# Patient Record
Sex: FEMALE | NOT HISPANIC OR LATINO | Employment: UNEMPLOYED | URBAN - METROPOLITAN AREA
[De-identification: names, ages, dates, MRNs, and addresses within clinical notes are randomized per-mention and may not be internally consistent; named-entity substitution may affect disease eponyms.]

---
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
## 2021-04-08 ENCOUNTER — TELEMEDICINE (OUTPATIENT)
Dept: FAMILY MEDICINE CLINIC | Facility: CLINIC | Age: 21
End: 2021-04-08

## 2021-04-08 DIAGNOSIS — T78.40XA ALLERGY, INITIAL ENCOUNTER: Primary | ICD-10-CM

## 2021-04-08 PROCEDURE — 99213 OFFICE O/P EST LOW 20 MIN: CPT | Performed by: FAMILY MEDICINE

## 2021-04-08 RX ORDER — CETIRIZINE HYDROCHLORIDE 10 MG/1
10 TABLET ORAL DAILY
Qty: 30 TABLET | Refills: 11 | Status: SHIPPED | OUTPATIENT
Start: 2021-04-08 | End: 2022-11-01

## 2021-04-08 NOTE — PROGRESS NOTES
Subjective   Marcelina Arauz is a 20 y.o. female.   Consent given    Time 20 min    Visit via [facility]    CC: follow up from ER for blood in postnasal drainage    History of Present Illness     follow up from ER for blood in postnasal drainage    The following portions of the patient's history were reviewed and updated as appropriate: allergies, current medications, past family history, past medical history, past social history, past surgical history and problem list.    Past Medical History:   Diagnosis Date   • Never smoked any substance     Never smoker   • Vitamin D deficiency    • Well child visit        No past surgical history on file.    Family History   Problem Relation Age of Onset   • No Known Problems Other        Social History     Socioeconomic History   • Marital status: Single     Spouse name: Not on file   • Number of children: Not on file   • Years of education: Not on file   • Highest education level: Not on file   Tobacco Use   • Smoking status: Never Smoker   • Smokeless tobacco: Never Used   Substance and Sexual Activity   • Alcohol use: Defer   • Drug use: Defer       Current Outpatient Medications on File Prior to Visit   Medication Sig Dispense Refill   • Acetaminophen (MIDOL PO) Take  by mouth.     • ibuprofen (ADVIL,MOTRIN) 400 MG tablet Take 400 mg by mouth.     • vitamin D (ERGOCALCIFEROL) 06080 units capsule capsule Take 1 capsule by mouth 1 (One) Time Per Week. 5 capsule 11     No current facility-administered medications on file prior to visit.       Review of Systems   HENT: Positive for postnasal drip and rhinorrhea.        Recent Results (from the past 4704 hour(s))   VITAMIN B12    Collection Time: 12/01/20  3:23 PM    Specimen type and source: Serum, Blood   Result Value Ref Range    Vitamin B-12 345 213 - 816 pg/mL   FOLATE    Collection Time: 12/01/20  3:23 PM    Specimen: Fresh Frozen Plasma    Specimen type and source: Plasma, Blood   Result Value Ref Range    Folate 10.7 7.0 -  34.1 ng/mL   TSH    Collection Time: 12/01/20  3:23 PM    Specimen: Fresh Frozen Plasma    Specimen type and source: Plasma, Blood   Result Value Ref Range    TSH 0.935 0.470 - 4.680 u(iU)/mL   VITAMIN D 25 HYDROXY    Collection Time: 12/01/20  3:23 PM    Specimen type and source: Serum, Blood   Result Value Ref Range    25 Hydroxy, Vitamin D 21.1 (L) 30.0 - 100.0 ng/mL   CBC AND DIFFERENTIAL    Collection Time: 12/01/20  3:23 PM    Specimen type and source: Whole Blood, Blood   Result Value Ref Range    WBC 6.68 4.5 - 11.0 10*3/uL    RBC 4.35 4.0 - 5.2 10*6/uL    Hemoglobin 13.1 12.0 - 16.0 g/dL    Hematocrit 39.9 36.0 - 46.0 %    MCV 91.7 80.0 - 100.0 fL    MCH 30.1 26.0 - 34.0 pg    MCHC 32.8 31.0 - 37.0 g/dL    RDW 12.0 12.0 - 16.8 %    Platelets 276 140 - 440 10*3/uL    MPV 12.4 8.4 - 12.4 fL    Differential Type [facility] CBC w/AutoDiff (arb'U)    Neutrophil Rel % 60.4 45 - 80 %    Lymphocyte Rel % 30.1 15 - 50 %    Monocyte Rel % 7.6 0 - 15 %    Eosinophil % 1.2 0 - 7 %    Basophil Rel % 0.6 0 - 2 %    Immature Grans % 0.1 0.0 - 1.0 %    nRBC 0 0 /100(WBC)    Neutrophils Absolute 4.03 2.0 - 8.8 10*3/uL    Lymphocytes Absolute 2.01 0.7 - 5.5 10*3/uL    Monocytes Absolute 0.51 0.0 - 1.7 10*3/uL    Eosinophils Absolute 0.08 0.0 - 0.8 10*3/uL    Basophils Absolute 0.04 0.0 - 0.2 10*3/uL    Immature Grans, Absolute 0.01 0.00 - 0.10 10*3/uL     Objective   There were no vitals filed for this visit.  There is no height or weight on file to calculate BMI.  Physical Exam  Constitutional:       Appearance: Normal appearance.   Neurological:      Mental Status: She is alert.           Diagnoses and all orders for this visit:    1. Allergy, initial encounter (Primary)  -     cetirizine (zyrTEC) 10 MG tablet; Take 1 tablet by mouth Daily.  Dispense: 30 tablet; Refill: 11    ER follow up.

## 2021-09-19 ENCOUNTER — HOSPITAL ENCOUNTER (EMERGENCY)
Facility: HOSPITAL | Age: 21
Discharge: HOME | End: 2021-09-20
Attending: EMERGENCY MEDICINE | Admitting: EMERGENCY MEDICINE

## 2021-09-19 ENCOUNTER — APPOINTMENT (OUTPATIENT)
Dept: CT IMAGING | Facility: HOSPITAL | Age: 21
End: 2021-09-19

## 2021-09-19 VITALS
OXYGEN SATURATION: 100 % | HEART RATE: 109 BPM | SYSTOLIC BLOOD PRESSURE: 130 MMHG | TEMPERATURE: 98.5 F | BODY MASS INDEX: 18.27 KG/M2 | WEIGHT: 107 LBS | DIASTOLIC BLOOD PRESSURE: 70 MMHG | RESPIRATION RATE: 16 BRPM | HEIGHT: 64 IN
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
## 2021-09-19 DIAGNOSIS — S09.90XA MINOR HEAD INJURY, INITIAL ENCOUNTER: Primary | ICD-10-CM

## 2021-09-19 PROCEDURE — 99282 EMERGENCY DEPT VISIT SF MDM: CPT

## 2021-09-19 PROCEDURE — 70450 CT HEAD/BRAIN W/O DYE: CPT
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
## 2021-09-20 NOTE — DISCHARGE INSTRUCTIONS
Home, rest, Tylenol or ibuprofen as needed, follow head injury precautions, follow up with PCP for recheck. Return to care if symptoms worsen or with further concerns.
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
## 2021-10-12 ENCOUNTER — TELEPHONE (OUTPATIENT)
Dept: FAMILY MEDICINE CLINIC | Facility: CLINIC | Age: 21
End: 2021-10-12

## 2021-10-12 DIAGNOSIS — T78.40XA ALLERGY, INITIAL ENCOUNTER: Primary | ICD-10-CM

## 2022-02-28 ENCOUNTER — TELEPHONE (OUTPATIENT)
Dept: FAMILY MEDICINE CLINIC | Facility: CLINIC | Age: 22
End: 2022-02-28

## 2022-03-01 DIAGNOSIS — M54.50 CHRONIC BILATERAL LOW BACK PAIN WITHOUT SCIATICA: Primary | ICD-10-CM

## 2022-03-01 DIAGNOSIS — G89.29 CHRONIC BILATERAL LOW BACK PAIN WITHOUT SCIATICA: Primary | ICD-10-CM

## 2022-03-23 ENCOUNTER — HOSPITAL ENCOUNTER (OUTPATIENT)
Dept: PHYSICAL THERAPY | Facility: HOSPITAL | Age: 22
Setting detail: THERAPIES SERIES
Discharge: HOME | End: 2022-03-23

## 2022-03-23 DIAGNOSIS — M54.6 CHRONIC MIDLINE THORACIC BACK PAIN: Primary | ICD-10-CM

## 2022-03-23 DIAGNOSIS — G89.29 CHRONIC MIDLINE THORACIC BACK PAIN: Primary | ICD-10-CM

## 2022-03-23 PROCEDURE — 97110 THERAPEUTIC EXERCISES: CPT

## 2022-03-23 PROCEDURE — 97161 PT EVAL LOW COMPLEX 20 MIN: CPT
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
## 2022-03-31 ENCOUNTER — TELEPHONE (OUTPATIENT)
Dept: PHYSICAL THERAPY | Facility: HOSPITAL | Age: 22
End: 2022-03-31

## 2022-04-07 ENCOUNTER — TELEPHONE (OUTPATIENT)
Dept: PHYSICAL THERAPY | Facility: HOSPITAL | Age: 22
End: 2022-04-07

## 2022-04-07 DIAGNOSIS — G89.29 CHRONIC MIDLINE THORACIC BACK PAIN: Primary | ICD-10-CM

## 2022-04-07 DIAGNOSIS — M54.6 CHRONIC MIDLINE THORACIC BACK PAIN: Primary | ICD-10-CM

## 2022-04-07 NOTE — TELEPHONE ENCOUNTER
Called pt. Re: 3rd consecutive no show, pt. Reports she had been trying to call to reschedule as appointments given were conflicting with work schedule but she was unable to get through to speak with someone. Informed pt. Remaining visits have been canceled due to clinic no show policy, advised pt. To call and gave front office number to reschedule at more convenient time. Pt. Verbalized understanding.

## 2022-04-18 ENCOUNTER — HOSPITAL ENCOUNTER (OUTPATIENT)
Dept: PHYSICAL THERAPY | Facility: HOSPITAL | Age: 22
Setting detail: THERAPIES SERIES
Discharge: HOME | End: 2022-04-18

## 2022-04-18 DIAGNOSIS — M54.6 CHRONIC MIDLINE THORACIC BACK PAIN: Primary | ICD-10-CM

## 2022-04-18 DIAGNOSIS — G89.29 CHRONIC MIDLINE THORACIC BACK PAIN: Primary | ICD-10-CM

## 2022-04-18 PROCEDURE — 97110 THERAPEUTIC EXERCISES: CPT

## 2022-04-18 NOTE — THERAPY TREATMENT NOTE
Outpatient Physical Therapy Ortho Treatment Note  [facility]     Patient Name: Marcelina Arauz  : 2000  MRN: [id number]  Today's Date: 2022      Visit Date: 2022    Visit Dx:    ICD-10-CM ICD-9-CM   1. Chronic midline thoracic back pain  M54.6 724.1    G89.29 338.29       Patient Active Problem List   Diagnosis   • Plantar wart of left foot   • Allergies        Past Medical History:   Diagnosis Date   • Never smoked any substance     Never smoker   • Vitamin D deficiency    • Well child visit         No past surgical history on file.                     PT Assessment/Plan     Row Name 22 1200          PT Assessment    Assessment Comments Marcelina returns for first follow up from initial evaluation, several no shows secondary to schedule conflict with work. Overall reports reduction in tension, possible 30-40% improvement since beginning initial HEP. Continued to progress scapular strengthening this date with added prone row, row + rotation, S/L rotation with TB and seated H-A. Pt. tolerated session well, planning to be traveling from - with follow up scheduled upon return. Pt. will continue to benefit from skilled PT.  -            PT Plan    PT Plan Comments how was pain during travel?  -           User Key  (r) = Recorded By, (t) = Taken By, (c) = Cosigned By    Initials Name Provider Type    Geno Carty, PT Physical Therapist                   OP Exercises     Row Name 22 1200             Subjective Comments    Subjective Comments I feel like its not as tense.  -              Total Minutes    16110 - PT Therapeutic Exercise Minutes 39  -MH              Exercise 1    Exercise Name 1 UBE  -      Cueing 1 Verbal  -      Time 1 4 min  -      Additional Comments 2/2  -              Exercise 2    Exercise Name 2 standing shoulder ext - palms forward  -      Cueing 2 Verbal;Demo  -      Sets 2 2  -MH      Reps 2 10  -      Time 2 2sec  -      Additional  Comments RTB  -MH              Exercise 3    Exercise Name 3 standing low row  -MH      Cueing 3 Verbal;Demo  -MH      Sets 3 2  -MH      Reps 3 10  -MH      Time 3 2sec  -MH      Additional Comments RTB  -MH              Exercise 4    Exercise Name 4 prone scap retract - handsd behind head  -MH      Cueing 4 Verbal;Demo  -MH      Reps 4 10  -MH      Time 4 6sec  -MH              Exercise 5    Exercise Name 5 prone scap rows  -MH      Cueing 5 Verbal;Tactile  -MH      Reps 5 10ea  -MH      Time 5 5# DB  -MH              Exercise 6    Exercise Name 6 seated H-A  -MH      Cueing 6 Verbal;Demo  -MH      Sets 6 2  -MH      Reps 6 10  -MH      Additional Comments RTB  -MH              Exercise 7    Exercise Name 7 row + rotation  -MH      Cueing 7 Verbal;Demo  -MH      Sets 7 2  -MH      Reps 7 10e  -MH      Time 7 RTB  -MH            User Key  (r) = Recorded By, (t) = Taken By, (c) = Cosigned By    Initials Name Provider Type    Geno Carty, PT Physical Therapist                              PT OP Goals     Row Name 04/18/22 1200          PT Short Term Goals    STG Date to Achieve 04/23/22  -     STG 1 Pt. Will be independent with initial HEP to improve self-management of condition.  -     STG 1 Progress Ongoing  -     STG 2 Pt. will report pain </= 4/10 at worst during work day to improve QOL.  -     STG 2 Progress Ongoing  -            Long Term Goals    LTG Date to Achieve 05/22/22  -     LTG 1 Pt. Will be independent with advanced HEP to improve long-term management of condition and independence.  -     LTG 1 Progress Ongoing  -     LTG 2 Pt. will improve middle/lower trap strength >/= 4/5 to improve stability and reduce muscle guarding.  -     LTG 2 Progress Ongoing  -     LTG 3 Pt. will report ability to sit for >75 minutes at work without exacerbation of pain or need for stretches to improve ability to complete work tasks.  -     LTG 3 Progress Ongoing  -     LTG 4 Pt. will  verbalize reduced pain/tenderness through UT/LS to indicate reduced muscle guarding and reduce pain levels.  -     LTG 4 Progress Ongoing  -           User Key  (r) = Recorded By, (t) = Taken By, (c) = Cosigned By    Initials Name Provider Type    Geno Carty, PT Physical Therapist                Therapy Education  Education Details: Updated HEP Access Code: [email or web address]  Given: HEP, Symptoms/condition management  Program: Progressed  How Provided: Verbal, Demonstration, Written  Provided to: Patient  Level of Understanding: Verbalized, Demonstrated              Time Calculation:   Start Time: 1220  Stop Time: 1259  Time Calculation (min): 39 min  Total Timed Code Minutes- PT: 39 minute(s)  Timed Charges  54709 - PT Therapeutic Exercise Minutes: 39  Total Minutes  Timed Charges Total Minutes: 39   Total Minutes: 39  Therapy Charges for Today     Code Description Service Date Service Provider Modifiers Qty    51315210484 HC PT THER PROC EA 15 MIN 4/18/2022 Geno Rojas, PT GP 3                    Geno Rojas PT  4/18/2022

## 2022-05-25 ENCOUNTER — APPOINTMENT (OUTPATIENT)
Dept: PHYSICAL THERAPY | Facility: HOSPITAL | Age: 22
End: 2022-05-25

## 2022-06-01 ENCOUNTER — HOSPITAL ENCOUNTER (OUTPATIENT)
Dept: PHYSICAL THERAPY | Facility: HOSPITAL | Age: 22
Setting detail: THERAPIES SERIES
Discharge: HOME | End: 2022-06-01

## 2022-06-01 DIAGNOSIS — G89.29 CHRONIC MIDLINE THORACIC BACK PAIN: Primary | ICD-10-CM

## 2022-06-01 DIAGNOSIS — M54.6 CHRONIC MIDLINE THORACIC BACK PAIN: Primary | ICD-10-CM

## 2022-06-01 PROCEDURE — 97110 THERAPEUTIC EXERCISES: CPT
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
## 2022-07-26 ENCOUNTER — TELEPHONE (OUTPATIENT)
Dept: FAMILY MEDICINE CLINIC | Facility: CLINIC | Age: 22
End: 2022-07-26

## 2022-07-26 NOTE — TELEPHONE ENCOUNTER
LVMTCB    OK for hub and  can read message    I do not see any x-ray results on her chart from March 2022.   Please double check with patient where did she have them done and get me those reports

## 2022-07-26 NOTE — TELEPHONE ENCOUNTER
Caller: Marcelina Arauz    Relationship: Self    Best call back number: [phone]     PLEASE CALL PATIENT. SHE HAS QUESTIONS ABOUT HER XRAYS FROM BACK IN MARCH OF 2022.

## 2022-11-01 ENCOUNTER — OFFICE VISIT (OUTPATIENT)
Dept: FAMILY MEDICINE CLINIC | Facility: CLINIC | Age: 22
End: 2022-11-01

## 2022-11-01 VITALS
TEMPERATURE: 95.6 F | WEIGHT: 109 LBS | BODY MASS INDEX: 18.61 KG/M2 | HEART RATE: 78 BPM | HEIGHT: 64 IN | OXYGEN SATURATION: 100 % | SYSTOLIC BLOOD PRESSURE: 98 MMHG | DIASTOLIC BLOOD PRESSURE: 62 MMHG

## 2022-11-01 DIAGNOSIS — R63.4 WEIGHT LOSS: Primary | ICD-10-CM

## 2022-11-01 DIAGNOSIS — R53.82 CHRONIC FATIGUE: ICD-10-CM

## 2022-11-01 DIAGNOSIS — R63.0 LOSS OF APPETITE: ICD-10-CM

## 2022-11-01 DIAGNOSIS — L65.9 HAIR LOSS: ICD-10-CM

## 2022-11-01 PROCEDURE — 99214 OFFICE O/P EST MOD 30 MIN: CPT | Performed by: FAMILY MEDICINE

## 2022-11-01 NOTE — PROGRESS NOTES
Subjective   Marcelina Arauz is a 22 y.o. female.     Chief Complaint   Patient presents with   • Dizziness     Fatique off and on    • Alopecia       History of Present Illness     Patient is complaining on fatigue and weight loss.  She states that she is having loss of appetite and therefore losing weight at this time she is 109 pounds.  She also reports hair loss  The following portions of the patient's history were reviewed and updated as appropriate: allergies, current medications, past family history, past medical history, past social history, past surgical history and problem list.    Past Medical History:   Diagnosis Date   • Never smoked any substance     Never smoker   • Vitamin D deficiency    • Well child visit        History reviewed. No pertinent surgical history.    Family History   Problem Relation Age of Onset   • No Known Problems Other        Social History     Socioeconomic History   • Marital status: Single   Tobacco Use   • Smoking status: Never   • Smokeless tobacco: Never   Vaping Use   • Vaping Use: Never used   Substance and Sexual Activity   • Alcohol use: Defer   • Drug use: Defer       Current Outpatient Medications on File Prior to Visit   Medication Sig Dispense Refill   • Acetaminophen (MIDOL PO) Take  by mouth.     • ibuprofen (ADVIL,MOTRIN) 400 MG tablet Take 400 mg by mouth.     • [DISCONTINUED] cetirizine (zyrTEC) 10 MG tablet Take 1 tablet by mouth Daily. 30 tablet 11   • [DISCONTINUED] nitrofurantoin, macrocrystal-monohydrate, (Macrobid) 100 MG capsule Take 1 capsule by mouth 2 (Two) Times a Day. 14 capsule 0   • [DISCONTINUED] vitamin D (ERGOCALCIFEROL) 36134 units capsule capsule Take 1 capsule by mouth 1 (One) Time Per Week. 5 capsule 11     No current facility-administered medications on file prior to visit.       Review of Systems   Constitutional: Positive for fatigue and unexpected weight loss. Negative for unexpected weight gain.   Skin:        Hair loss       Recent Results  "(from the past 4704 hour(s))   Urine Culture - Urine, Urine, Clean Catch    Collection Time: 09/23/22  2:33 PM    Specimen: Urine, Clean Catch   Result Value Ref Range    Urine Culture Final report     Result 1 Comment    POC Urinalysis Dipstick, Multipro (Automated Dipstick)    Collection Time: 09/23/22  2:37 PM    Specimen: Urine   Result Value Ref Range    Color Orange (A) Yellow, Straw, Dark Yellow, Antonette    Clarity, UA Clear Clear    Glucose,  mg/dL (A) Negative mg/dL    Bilirubin Small (1+) (A) Negative    Ketones, UA 15 mg/dL (A) Negative    Specific Gravity  1.010 1.005 - 1.030    Blood, UA Large (A) Negative    pH, Urine 5.0 5.0 - 8.0    Protein,  mg/dL (A) Negative mg/dL    Urobilinogen, UA 4 E.U./dL (A) Normal, 0.2 E.U./dL    Nitrite, UA Positive (A) Negative    Leukocytes Large (3+) (A) Negative     Objective   Vitals:    11/01/22 1421   BP: 98/62   BP Location: Right arm   Patient Position: Sitting   Cuff Size: Adult   Pulse: 78   Temp: 95.6 °F (35.3 °C)   TempSrc: Temporal   SpO2: 100%   Weight: 49.4 kg (109 lb)   Height: 162.6 cm (64\")     Body mass index is 18.71 kg/m².  Physical Exam  Vitals and nursing note reviewed.   Constitutional:       General: She is not in acute distress.     Appearance: She is well-developed. She is not diaphoretic.   Cardiovascular:      Rate and Rhythm: Normal rate and regular rhythm.   Pulmonary:      Effort: Pulmonary effort is normal. No respiratory distress.      Breath sounds: Normal breath sounds. No wheezing.           Diagnoses and all orders for this visit:    1. Weight loss (Primary)  -     Vitamin B12 & Folate  -     Vitamin D,25-Hydroxy  -     Vitamin B7 (Biotin)  -     TSH Rfx On Abnormal To Free T4  -     CBC & Differential  -     Comprehensive Metabolic Panel    2. Chronic fatigue  -     Vitamin B12 & Folate  -     Vitamin D,25-Hydroxy  -     Vitamin B7 (Biotin)  -     TSH Rfx On Abnormal To Free T4  -     CBC & Differential  -     " Comprehensive Metabolic Panel    3. Hair loss  -     TSH Rfx On Abnormal To Free T4    4. Loss of appetite      Dietary instructions were discussed with patient in detail, counseled  Return if symptoms worsen or fail to improve.

## 2022-11-03 DIAGNOSIS — E55.9 VITAMIN D DEFICIENCY: Primary | ICD-10-CM

## 2022-11-03 LAB
25(OH)D3+25(OH)D2 SERPL-MCNC: 19.4 NG/ML (ref 30–100)
ALBUMIN SERPL-MCNC: 5 G/DL (ref 3.5–5.2)
ALBUMIN/GLOB SERPL: 2.4 G/DL
ALP SERPL-CCNC: 56 U/L (ref 39–117)
ALT SERPL-CCNC: 15 U/L (ref 1–33)
AST SERPL-CCNC: 15 U/L (ref 1–32)
BASOPHILS # BLD AUTO: 0.06 10*3/MM3 (ref 0–0.2)
BASOPHILS NFR BLD AUTO: 0.9 % (ref 0–1.5)
BILIRUB SERPL-MCNC: 0.4 MG/DL (ref 0–1.2)
BIOTIN SERPL-MCNC: 0.13 NG/ML (ref 0.05–0.83)
BUN SERPL-MCNC: 9 MG/DL (ref 6–20)
BUN/CREAT SERPL: 17.3 (ref 7–25)
CALCIUM SERPL-MCNC: 10 MG/DL (ref 8.6–10.5)
CHLORIDE SERPL-SCNC: 103 MMOL/L (ref 98–107)
CO2 SERPL-SCNC: 27.5 MMOL/L (ref 22–29)
CREAT SERPL-MCNC: 0.52 MG/DL (ref 0.57–1)
EGFRCR SERPLBLD CKD-EPI 2021: 135.8 ML/MIN/1.73
EOSINOPHIL # BLD AUTO: 0.09 10*3/MM3 (ref 0–0.4)
EOSINOPHIL NFR BLD AUTO: 1.3 % (ref 0.3–6.2)
ERYTHROCYTE [DISTWIDTH] IN BLOOD BY AUTOMATED COUNT: 12.2 % (ref 12.3–15.4)
FOLATE SERPL-MCNC: 14.2 NG/ML (ref 4.78–24.2)
GLOBULIN SER CALC-MCNC: 2.1 GM/DL
GLUCOSE SERPL-MCNC: 85 MG/DL (ref 65–99)
HCT VFR BLD AUTO: 40.3 % (ref 34–46.6)
HGB BLD-MCNC: 13.6 G/DL (ref 12–15.9)
IMM GRANULOCYTES # BLD AUTO: 0.03 10*3/MM3 (ref 0–0.05)
IMM GRANULOCYTES NFR BLD AUTO: 0.4 % (ref 0–0.5)
LYMPHOCYTES # BLD AUTO: 1.69 10*3/MM3 (ref 0.7–3.1)
LYMPHOCYTES NFR BLD AUTO: 24.9 % (ref 19.6–45.3)
MCH RBC QN AUTO: 30.5 PG (ref 26.6–33)
MCHC RBC AUTO-ENTMCNC: 33.7 G/DL (ref 31.5–35.7)
MCV RBC AUTO: 90.4 FL (ref 79–97)
MONOCYTES # BLD AUTO: 0.37 10*3/MM3 (ref 0.1–0.9)
MONOCYTES NFR BLD AUTO: 5.4 % (ref 5–12)
NEUTROPHILS # BLD AUTO: 4.55 10*3/MM3 (ref 1.7–7)
NEUTROPHILS NFR BLD AUTO: 67.1 % (ref 42.7–76)
NRBC BLD AUTO-RTO: 0.1 /100 WBC (ref 0–0.2)
PLATELET # BLD AUTO: 259 10*3/MM3 (ref 140–450)
POTASSIUM SERPL-SCNC: 4.9 MMOL/L (ref 3.5–5.2)
PROT SERPL-MCNC: 7.1 G/DL (ref 6–8.5)
RBC # BLD AUTO: 4.46 10*6/MM3 (ref 3.77–5.28)
SODIUM SERPL-SCNC: 140 MMOL/L (ref 136–145)
TSH SERPL DL<=0.005 MIU/L-ACNC: 0.98 UIU/ML (ref 0.27–4.2)
VIT B12 SERPL-MCNC: 331 PG/ML (ref 211–946)
WBC # BLD AUTO: 6.79 10*3/MM3 (ref 3.4–10.8)

## 2022-11-03 RX ORDER — CHOLECALCIFEROL (VITAMIN D3) 1250 MCG
50000 CAPSULE ORAL
Qty: 12 CAPSULE | Refills: 3 | Status: SHIPPED | OUTPATIENT
Start: 2022-11-03

## 2022-11-09 ENCOUNTER — TELEPHONE (OUTPATIENT)
Dept: FAMILY MEDICINE CLINIC | Facility: CLINIC | Age: 22
End: 2022-11-09

## 2022-11-09 NOTE — TELEPHONE ENCOUNTER
Caller: estiven nicole    Relationship: Mother    Best call back number:     Caller requesting test results:     What test was performed: LABS    When was the test performed: 1 WEEK AGO    Where was the test performed:     Additional notes: PATIENTS MOTHER ESTIVEN IS CALLING IN TO GET THE PATIENTS LAST LAB RESULTS.